# Patient Record
Sex: FEMALE | Race: WHITE | NOT HISPANIC OR LATINO | Employment: FULL TIME | URBAN - METROPOLITAN AREA
[De-identification: names, ages, dates, MRNs, and addresses within clinical notes are randomized per-mention and may not be internally consistent; named-entity substitution may affect disease eponyms.]

---

## 2022-10-24 ENCOUNTER — TELEPHONE (OUTPATIENT)
Dept: OTOLARYNGOLOGY | Facility: CLINIC | Age: 67
End: 2022-10-24

## 2022-10-24 NOTE — TELEPHONE ENCOUNTER
Received referral, however, patient's insurance has denied out of network coverage for appt. Faxed response to referring office.    ----- Message from Deshawn Frost MA sent at 10/24/2022  9:59 AM CDT -----  Contact: [phone]    ----- Message -----  From: Asiya Guan  Sent: 10/24/2022   9:56 AM CDT  To: [facility] Clinical Staff, #    Pt calling to schedule appt with Dr. Rinku ng please call and advise @ [phone]

## 2022-11-04 ENCOUNTER — TELEPHONE (OUTPATIENT)
Dept: OTOLARYNGOLOGY | Facility: CLINIC | Age: 67
End: 2022-11-04

## 2022-11-04 NOTE — TELEPHONE ENCOUNTER
----- Message from Debora Webster MA sent at 11/3/2022 12:56 PM CDT -----  Contact: [phone]  Pt states she was waiting on a phone call but have not heard anything back from anyone regarding an appt with Dr. Dobbs. Pt states she should be approved for appointment because her insurance is going to be changed from HMO to PPO. Please reach back out to pt at [phone]

## 2022-11-07 ENCOUNTER — PATIENT MESSAGE (OUTPATIENT)
Dept: OTOLARYNGOLOGY | Facility: CLINIC | Age: 67
End: 2022-11-07

## 2022-12-08 ENCOUNTER — PATIENT MESSAGE (OUTPATIENT)
Dept: OTOLARYNGOLOGY | Facility: CLINIC | Age: 67
End: 2022-12-08

## 2022-12-08 DIAGNOSIS — J38.3 SPASMODIC DYSPHONIA: Primary | ICD-10-CM

## 2023-01-04 ENCOUNTER — PATIENT MESSAGE (OUTPATIENT)
Dept: OTOLARYNGOLOGY | Facility: CLINIC | Age: 68
End: 2023-01-04
Payer: MEDICARE

## 2023-01-09 ENCOUNTER — OFFICE VISIT (OUTPATIENT)
Dept: OTOLARYNGOLOGY | Facility: CLINIC | Age: 68
End: 2023-01-09
Payer: COMMERCIAL

## 2023-01-09 VITALS — SYSTOLIC BLOOD PRESSURE: 105 MMHG | DIASTOLIC BLOOD PRESSURE: 71 MMHG

## 2023-01-09 DIAGNOSIS — R49.0 DYSPHONIA: ICD-10-CM

## 2023-01-09 DIAGNOSIS — J38.5 LARYNGEAL SPASM: ICD-10-CM

## 2023-01-09 DIAGNOSIS — J38.3 SPASMODIC DYSPHONIA: Primary | ICD-10-CM

## 2023-01-09 DIAGNOSIS — G24.9 DYSTONIA: ICD-10-CM

## 2023-01-09 PROCEDURE — 1126F PR PAIN SEVERITY QUANTIFIED, NO PAIN PRESENT: ICD-10-PCS | Mod: CPTII,S$GLB,, | Performed by: OTOLARYNGOLOGY

## 2023-01-09 PROCEDURE — 64617 CHEMODENER MUSCLE LARYNX EMG: CPT | Mod: 50,S$GLB,, | Performed by: OTOLARYNGOLOGY

## 2023-01-09 PROCEDURE — 1101F PT FALLS ASSESS-DOCD LE1/YR: CPT | Mod: CPTII,S$GLB,, | Performed by: OTOLARYNGOLOGY

## 2023-01-09 PROCEDURE — 1159F PR MEDICATION LIST DOCUMENTED IN MEDICAL RECORD: ICD-10-PCS | Mod: CPTII,S$GLB,, | Performed by: OTOLARYNGOLOGY

## 2023-01-09 PROCEDURE — 1160F PR REVIEW ALL MEDS BY PRESCRIBER/CLIN PHARMACIST DOCUMENTED: ICD-10-PCS | Mod: CPTII,S$GLB,, | Performed by: OTOLARYNGOLOGY

## 2023-01-09 PROCEDURE — 3078F PR MOST RECENT DIASTOLIC BLOOD PRESSURE < 80 MM HG: ICD-10-PCS | Mod: CPTII,S$GLB,, | Performed by: OTOLARYNGOLOGY

## 2023-01-09 PROCEDURE — 1159F MED LIST DOCD IN RCRD: CPT | Mod: CPTII,S$GLB,, | Performed by: OTOLARYNGOLOGY

## 2023-01-09 PROCEDURE — 31579 LARYNGOSCOPY TELESCOPIC: CPT | Mod: 51,S$GLB,, | Performed by: OTOLARYNGOLOGY

## 2023-01-09 PROCEDURE — 1126F AMNT PAIN NOTED NONE PRSNT: CPT | Mod: CPTII,S$GLB,, | Performed by: OTOLARYNGOLOGY

## 2023-01-09 PROCEDURE — 31579 PR LARYNGOSCOPY, FLEX/RIGID TELESCOPIC, W/STROBOSCOPY: ICD-10-PCS | Mod: 51,S$GLB,, | Performed by: OTOLARYNGOLOGY

## 2023-01-09 PROCEDURE — 99999 PR PBB SHADOW E&M-EST. PATIENT-LVL III: CPT | Mod: PBBFAC,,, | Performed by: OTOLARYNGOLOGY

## 2023-01-09 PROCEDURE — 3078F DIAST BP <80 MM HG: CPT | Mod: CPTII,S$GLB,, | Performed by: OTOLARYNGOLOGY

## 2023-01-09 PROCEDURE — 99999 PR PBB SHADOW E&M-EST. PATIENT-LVL III: ICD-10-PCS | Mod: PBBFAC,,, | Performed by: OTOLARYNGOLOGY

## 2023-01-09 PROCEDURE — 1160F RVW MEDS BY RX/DR IN RCRD: CPT | Mod: CPTII,S$GLB,, | Performed by: OTOLARYNGOLOGY

## 2023-01-09 PROCEDURE — 3074F SYST BP LT 130 MM HG: CPT | Mod: CPTII,S$GLB,, | Performed by: OTOLARYNGOLOGY

## 2023-01-09 PROCEDURE — 3288F FALL RISK ASSESSMENT DOCD: CPT | Mod: CPTII,S$GLB,, | Performed by: OTOLARYNGOLOGY

## 2023-01-09 PROCEDURE — 99204 OFFICE O/P NEW MOD 45 MIN: CPT | Mod: 25,S$GLB,, | Performed by: OTOLARYNGOLOGY

## 2023-01-09 PROCEDURE — 64617 PR CHEMODENERVATION LARYNX, UNI, PERCU EMG: ICD-10-PCS | Mod: 50,S$GLB,, | Performed by: OTOLARYNGOLOGY

## 2023-01-09 PROCEDURE — 99204 PR OFFICE/OUTPT VISIT, NEW, LEVL IV, 45-59 MIN: ICD-10-PCS | Mod: 25,S$GLB,, | Performed by: OTOLARYNGOLOGY

## 2023-01-09 PROCEDURE — 3288F PR FALLS RISK ASSESSMENT DOCUMENTED: ICD-10-PCS | Mod: CPTII,S$GLB,, | Performed by: OTOLARYNGOLOGY

## 2023-01-09 PROCEDURE — 1101F PR PT FALLS ASSESS DOC 0-1 FALLS W/OUT INJ PAST YR: ICD-10-PCS | Mod: CPTII,S$GLB,, | Performed by: OTOLARYNGOLOGY

## 2023-01-09 PROCEDURE — 3074F PR MOST RECENT SYSTOLIC BLOOD PRESSURE < 130 MM HG: ICD-10-PCS | Mod: CPTII,S$GLB,, | Performed by: OTOLARYNGOLOGY

## 2023-01-09 NOTE — PROGRESS NOTES
[facility]  Department of Otorhinolaryngology and Communication Sciences    Preoperative Diagnosis: 1. Adductor spasmodic dypshonia.  2. Laryngeal spasm.    Postoperative Diagnosis: 1. Adductor spasmodic dypshonia.  2. Laryngeal spasm.    Procedure: Chemodenervation of larynx, percutaneous, under guidance of needle electromyography, bilateral thyroarytenoid/lateral cricoarytenoid complex.    Toxin serotype used: Botox.    Total units employed:   1. Left TA/LCA - 2 units.  2. Right TA/LCA - 2 units.  3.  46 units wasted.    Surgeon: Yanick Dobbs M.D.     Estimated blood loss: None.    Anesthesia: Local.     Complications: None.    Procedure in detail: Madison Francis was positively identified with two identifiers and was seated upright in a comfortable position. Final time-out was performed for verification purposes. Local cutaneous and subcutaneous anesthesia was achieved with 1 mL of 1% lidocaine with epinephrine 1:100,000, injected into the skin and subcutaneous tissues at the level of the cricothyroid membrane. Surface electrodes were connected. Botulinum toxin was reconstituted with sterile normal saline at a concentration of 20 units/mL. A 1 mL tuberculin syringe pre-loaded with botulinum toxin was connected to a 37 mm 26-gauge injectable needle electrode. The needle was advanced percutaneously into the targeted muscle groups. Appropriate insertional activity and recruitment were noted based on visual and auditory feedback of electromyography. Under electromyographic guidance, botulinum toxin was administered, with the quantity of toxin used and muscle groups targeted outlined above. The equipment was removed. The patient tolerated the procedure well without complication. All needle counts were correct at the completion of the procedure. The patient was observed briefly before being discharged home in good condition.    Yanick Dobbs M.D.  [facility]  Department of  Otorhinolaryngology and Communication Sciences

## 2023-01-09 NOTE — PROGRESS NOTES
[facility]  Department of Otorhinolaryngology and Communication Sciences    Madison Francis is a 68 y.o. female who [facility] the [facility] for consultation at the kind request of Dr. 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     She complains of breaks in her voice, phonatory dyspnea, increased vocal effort. Difficulty reading aloud. Tremors in the voice. Unable to sing.    Onset was gradual. Duration is about 7 years, beginning without any clear inciting event while she was  at [facility]. Time course is constant. Symptoms are worsening. Exacerbating factors include stressful situations, telephone, certain combinations of letters. Alleviating factors include a glass of wine. She denies any associated symptoms.  She denies tremors/spasms/involuntary movements outside her voice issue.    She is currently pursuing a career in photography.    She has not seen a neurologist. Her brother has PD.    Dr. Devries suspects a Dx of SD and recommends consideration of btx injections.    Voice Handicap Index Total Score  1/2/2023   Voice Handicap Index Total Score 22     Reflux Symptoms Index Total Score 1/2/2023   Reflux Symptoms Index Total Score 11       Past Medical History  History breast DCIS    Past Surgical History  Lumpectomy  Cholecystectomy  Appendectomy  Tonsillectomy    Family History  Mother with BrCA  Father with throat (palate) cancer    Social History  She reports that she quit smoking about 2 years ago. Her smoking use included cigarettes and vaping with nicotine. She started smoking about 33 years ago. She has a 1.00 pack-year smoking history. She does not have any smokeless tobacco history on file.    Allergies  She is allergic to naproxen.    Medications  None    Review of Systems   Constitutional: Negative.  Negative for fever.   HENT:  Positive for ear pain, postnasal drip, sinus pressure and voice change. Negative for sore throat.     Eyes:  Positive for photophobia and itching. Negative for visual disturbance.   Respiratory: Negative.  Negative for wheezing.    Cardiovascular: Negative.  Negative for chest pain.   Gastrointestinal:  Positive for constipation. Negative for nausea.   Endocrine: Negative.    Genitourinary: Negative.    Musculoskeletal: Negative.  Negative for arthralgias.   Skin: Negative.  Negative for rash.   Neurological:  Positive for dizziness and headaches. Negative for tremors.   Hematological: Negative.  Does not bruise/bleed easily.   Psychiatric/Behavioral:  Positive for decreased concentration. The patient is not nervous/anxious.         Objective:     VS reviewed  Physical Exam  Constitutional: comfortable, well dressed  Psychiatric: appropriate affect  Respiratory: comfortably breathing, symmetric chest rise, no stridor  Voice: variable mild to severely tight/strained with breaks on voiced phonemes  Cardiovascular: upper extremities non-edematous  Lymphatic: no cervical lymphadenopathy  Neurologic: alert and oriented to time, place, person, and situation; cranial nerves 3-12 grossly intact  Head: normocephalic  Eyes: conjunctivae and sclerae clear  Ears: normal pinnae, normal external auditory canals, tympanic membranes intact  Nose: mucosa pink and noncongested, no masses, no mucopurulence, no polyps  Oral cavity / oropharynx: no mucosal lesions  Neck: soft, full range of motion, laryngotracheal complex palpable with appropriate landmarks, larynx elevates on swallowing  Indirect laryngoscopy: limited due to gag    Procedure  Flexible Laryngeal Videostroboscopy (39901): Laryngeal videostroboscopy is indicated to assess laryngeal vibratory biomechanics and vocal fold oscillation, which cannot be assessed with a plain light examination. This was carried out transnasally with a distal chip videoendoscope. After verbal consent was obtained, the patient was positioned and the nose was topically decongested with 1%  phenylephrine and topically anesthetized with 4% lidocaine. The endoscope was passed through the most patent nasal cavity and positioned to image the nasopharynx, larynx, and hypopharynx in detail. The following features were examined: nasopharyngeal, laryngeal, hypopharyngeal masses; velopharyngeal strength, closure, and symmetry of motion; vocal fold range and symmetry of motion; laryngeal mucosal edema, erythema, inflammation, and hydration; salivary pooling; and gross laryngeal sensation. During phonation, the vocal folds were assessed for glottal closure; mucosal wave; vocal fold lesions; vibratory periodicity, amplitude, and phase symmetry; and vertical height match. The equipment was removed. The patient tolerated the procedure well without complication. All findings were normal except:  - variable phonatory glottic overclosure posteriorly resulting in shortened phonatroy segment and impaired mucosal wave amplitudes         Assessment:     Madison Francis is a 68 y.o. 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       Plan:        I had a discussion with the patient regarding her condition and the further workup and management options.      I recommended comprehensive evaluation by movement disorders neurology.    The risks of laryngeal botulinum toxin chemodenervation were discussed at length with the patient. Risks included but were not limited to pain, bleeding, infection, worsening of voice, worsening of swallowing, dysphagia, aspiration, shortness of breath, noisy breathing, airway obstruction, need for additional injections or procedures, reaction to medication, and development of tolerance to the medication. Our careful and patient-centered approach will try to minimize side effects while also treating the dystonia/spasms/tremors. We emphasized to the patient that sometimes it takes a few injections to determine what dosing strategy works best for  each patient. All questions were answered, and the patient wishes to proceed.    See procedure note for details.    She will follow up with me in about 1 month(s).    All questions were answered, and the patient is in agreement with the above.     Yanick Dobbs M.D.  [facility]  Department of Otorhinolaryngology and Communication Sciences

## 2023-01-10 ENCOUNTER — PATIENT MESSAGE (OUTPATIENT)
Dept: OTOLARYNGOLOGY | Facility: CLINIC | Age: 68
End: 2023-01-10
Payer: COMMERCIAL

## 2023-01-25 ENCOUNTER — PATIENT MESSAGE (OUTPATIENT)
Dept: OTOLARYNGOLOGY | Facility: CLINIC | Age: 68
End: 2023-01-25
Payer: COMMERCIAL

## 2023-02-13 ENCOUNTER — DOCUMENTATION ONLY (OUTPATIENT)
Dept: OTOLARYNGOLOGY | Facility: CLINIC | Age: 68
End: 2023-02-13
Payer: COMMERCIAL

## 2023-02-13 NOTE — PROGRESS NOTES
Initial btx injection 1/9/2023 2 units bilateral TAs  Breathy x 2-3 days; no dysphagia  Tremors persist but voice is improved; continues to improve in recent days/weeks  Much more confidence, but still with some pitch instability    Will follow up for subsequent injection as voice deteriorates  Will titrate next dose as needed

## 2023-02-14 DIAGNOSIS — J38.3 SPASMODIC DYSPHONIA: Primary | ICD-10-CM

## 2023-02-14 DIAGNOSIS — R49.0 DYSPHONIA: ICD-10-CM

## 2023-02-14 DIAGNOSIS — J38.5 LARYNGEAL SPASM: ICD-10-CM

## 2023-03-10 ENCOUNTER — PROCEDURE VISIT (OUTPATIENT)
Dept: OTOLARYNGOLOGY | Facility: CLINIC | Age: 68
End: 2023-03-10
Payer: COMMERCIAL

## 2023-03-10 VITALS
WEIGHT: 155 LBS | DIASTOLIC BLOOD PRESSURE: 72 MMHG | SYSTOLIC BLOOD PRESSURE: 112 MMHG | BODY MASS INDEX: 24.91 KG/M2 | HEIGHT: 66 IN | HEART RATE: 67 BPM

## 2023-03-10 DIAGNOSIS — R49.0 DYSPHONIA: ICD-10-CM

## 2023-03-10 DIAGNOSIS — J38.5 LARYNGEAL SPASM: ICD-10-CM

## 2023-03-10 DIAGNOSIS — J38.3 SPASMODIC DYSPHONIA: Primary | ICD-10-CM

## 2023-03-10 PROCEDURE — 64617 CHEMODENER MUSCLE LARYNX EMG: CPT | Mod: 50,S$GLB,, | Performed by: OTOLARYNGOLOGY

## 2023-03-10 PROCEDURE — 64617 PR CHEMODENERVATION LARYNX, UNI, PERCU EMG: ICD-10-PCS | Mod: 50,S$GLB,, | Performed by: OTOLARYNGOLOGY

## 2023-03-10 RX ORDER — LORATADINE 10 MG/1
TABLET ORAL
COMMUNITY

## 2023-03-10 RX ORDER — LEVOCETIRIZINE DIHYDROCHLORIDE 5 MG/1
TABLET, FILM COATED ORAL
COMMUNITY

## 2023-03-10 RX ORDER — ERGOCALCIFEROL 1.25 MG/1
50000 CAPSULE ORAL
COMMUNITY
Start: 2022-10-21

## 2023-03-10 RX ORDER — AZELASTINE 1 MG/ML
SPRAY, METERED NASAL
COMMUNITY

## 2023-03-10 RX ORDER — FLUTICASONE PROPIONATE 50 MCG
SPRAY, SUSPENSION (ML) NASAL
COMMUNITY

## 2023-03-10 NOTE — PATIENT INSTRUCTIONS
What to expect:  Botox for adductor spasmodic dysphonia    The purpose of the Botox injection into the larynx is to cause a temporary weakening of the vocal fold muscles in order to reduce the voice spasms.  It takes several days to begin working, but after the first few days you will notice a decrease in the spasms.  Every injection may be slightly different, so it is important for you to keep track of your voice and swallowing status on the log sheet provided.  Please return the log each time you come in for an injection.      Basic Botox guidelines  Within 24-72 hours, you should expect your voice to become weak and breathy.  After one week, call into the Voice Center at [phone] and leave a message so our team can hear your voice and  the effectiveness of the injection.   We will not call you back unless you ask us to do so.      After 1-2 weeks, your voice should start to get stronger and have fewer spasms.  The stronger voice typically lasts for about three months, but there is some variability.    You may experience some difficulties with swallowing for the first week after your injection, but these should be short-lived.  Take extra care when drinking thin liquids such as water, coffee, juice, soda, and tea.  If you do find yourself coughing frequently with liquids, try tucking your chin while you swallow, or thickening your liquids.  Thick-It, a liquid thickener, can be purchased at most pharmacies.      Helpful resources:  http://www.dysphonia.org   http://www.kiki.org  Http://www.entlink.net    If you have any questions, please call our office at [phone], or contact us through the [facility] portal.

## 2023-03-10 NOTE — PROCEDURES
Procedures  [facility]  Department of Otorhinolaryngology and Communication Sciences    Last injection: 1/9/2023, 2 U bilateral TAs  Duration of breathiness: 2-3 days  Duration of dysphagia: 0 days  Duration:     Preoperative Diagnosis: 1. Adductor spasmodic dypshonia.  2. Laryngeal spasm.    Postoperative Diagnosis: 1. Adductor spasmodic dypshonia.  2. Laryngeal spasm.    Procedure: Chemodenervation of larynx, percutaneous, under guidance of needle electromyography, bilateral thyroarytenoid/lateral cricoarytenoid complex.    Toxin serotype used: Botox.    Total units employed:   1. Left TA/LCA - 2.5 units.  2. Right TA/LCA - 2.5 units.  3.  45 units wasted.    Surgeon: Yanick Dobbs M.D.     Estimated blood loss: None.    Anesthesia: Local.     Complications: None.    Procedure in detail: Madison Francis was positively identified with two identifiers and was seated upright in a comfortable position. Final time-out was performed for verification purposes. Local cutaneous and subcutaneous anesthesia was achieved with 1 mL of 1% lidocaine with epinephrine 1:100,000, injected into the skin and subcutaneous tissues at the level of the cricothyroid membrane. Surface electrodes were connected. Botulinum toxin was reconstituted with sterile normal saline at a concentration of 25 units/mL. A 1 mL tuberculin syringe pre-loaded with botulinum toxin was connected to a 37 mm 26-gauge injectable needle electrode. The needle was advanced percutaneously into the targeted muscle groups. Appropriate insertional activity and recruitment were noted based on visual and auditory feedback of electromyography. Under electromyographic guidance, botulinum toxin was administered, with the quantity of toxin used and muscle groups targeted outlined above. The equipment was removed. The patient tolerated the procedure well without complication. All needle counts were correct at the completion of the procedure. The patient was  observed briefly before being discharged home in good condition.    Yanick Dobbs M.D.  [facility]  Department of Otorhinolaryngology and Communication Sciences

## 2023-06-28 DIAGNOSIS — J38.5 LARYNGEAL SPASM: ICD-10-CM

## 2023-06-28 DIAGNOSIS — J38.3 SPASMODIC DYSPHONIA: Primary | ICD-10-CM

## 2023-08-11 ENCOUNTER — PROCEDURE VISIT (OUTPATIENT)
Dept: OTOLARYNGOLOGY | Facility: CLINIC | Age: 68
End: 2023-08-11
Payer: COMMERCIAL

## 2023-08-11 VITALS — DIASTOLIC BLOOD PRESSURE: 49 MMHG | SYSTOLIC BLOOD PRESSURE: 91 MMHG | HEART RATE: 76 BPM

## 2023-08-11 DIAGNOSIS — J38.5 LARYNGEAL SPASM: ICD-10-CM

## 2023-08-11 DIAGNOSIS — R49.0 DYSPHONIA: ICD-10-CM

## 2023-08-11 DIAGNOSIS — J38.3 SPASMODIC DYSPHONIA: Primary | ICD-10-CM

## 2023-08-11 PROCEDURE — 64617 PR CHEMODENERVATION LARYNX, UNI, PERCU EMG: ICD-10-PCS | Mod: 50,S$GLB,, | Performed by: OTOLARYNGOLOGY

## 2023-08-11 PROCEDURE — 64617 CHEMODENER MUSCLE LARYNX EMG: CPT | Mod: 50,S$GLB,, | Performed by: OTOLARYNGOLOGY

## 2023-08-11 NOTE — PROCEDURES
Procedures  [facility]  Department of Otorhinolaryngology and Communication Sciences    Last injection: 3/10/2023, 2.5 U bilateral TAs  Duration of breathiness: about 2 weeks  Duration of dysphagia: about 2 weeks  Duration of good voice:  almost 5 months; happy with result    Preoperative Diagnosis: 1. Adductor spasmodic dypshonia.  2. Laryngeal spasm.    Postoperative Diagnosis: 1. Adductor spasmodic dypshonia.  2. Laryngeal spasm.    Procedure: Chemodenervation of larynx, percutaneous, under guidance of needle electromyography, bilateral thyroarytenoid/lateral cricoarytenoid complex.    Toxin serotype used: Botox.    Total units employed:   1. Left TA/LCA - 2.5 units.  2. Right TA/LCA - 2.5 units.  3.  45 units wasted.    Surgeon: Yanick Dobbs M.D.     Estimated blood loss: None.    Anesthesia: Local.     Complications: None.    Procedure in detail: Madison Francis was positively identified with two identifiers and was seated upright in a comfortable position. Final time-out was performed for verification purposes. Local cutaneous and subcutaneous anesthesia was achieved with 1 mL of 1% lidocaine with epinephrine 1:100,000, injected into the skin and subcutaneous tissues at the level of the cricothyroid membrane. Surface electrodes were connected. Botulinum toxin was reconstituted with sterile normal saline at a concentration of 25 units/mL. A 1 mL tuberculin syringe pre-loaded with botulinum toxin was connected to a 37 mm 26-gauge injectable needle electrode. The needle was advanced percutaneously into the targeted muscle groups. Appropriate insertional activity and recruitment were noted based on visual and auditory feedback of electromyography. Under electromyographic guidance, botulinum toxin was administered, with the quantity of toxin used and muscle groups targeted outlined above. The equipment was removed. The patient tolerated the procedure well without complication. All needle counts were  correct at the completion of the procedure. The patient was observed briefly before being discharged home in good condition.    Yanick Dobbs M.D.  [facility]  Department of Otorhinolaryngology and Communication Sciences

## 2023-08-11 NOTE — PATIENT INSTRUCTIONS
What to expect:  Botox for adductor spasmodic dysphonia    The purpose of the Botox injection into the larynx is to cause a temporary weakening of the vocal fold muscles in order to reduce the voice spasms.  It takes several days to begin working, but after the first few days you will notice a decrease in the spasms.  Every injection may be slightly different, so it is important for you to keep track of your voice and swallowing status on the log sheet provided.  Please return the log each time you come in for an injection.      Basic Botox guidelines  Within 24-72 hours, you should expect your voice to become weak and breathy.  After one week, call into the Voice Center at [phone] and leave a message so our team can hear your voice and  the effectiveness of the injection.   We will not call you back unless you ask us to do so.      After 1-2 weeks, your voice should start to get stronger and have fewer spasms.  The stronger voice typically lasts for about three months, but there is some variability.    You may experience some difficulties with swallowing for the first week after your injection, but these should be short-lived.  Take extra care when drinking thin liquids such as water, coffee, juice, soda, and tea.  If you do find yourself coughing frequently with liquids, try tucking your chin while you swallow, or thickening your liquids.  Thick-It, a liquid thickener, can be purchased at most pharmacies.      Helpful resources:  http://www.dysphonia.org   http://www.kiki.org  Http://www.entlink.net    If you have any questions, please call our office at [phone], or contact us through the [facility] portal.

## 2024-05-07 DIAGNOSIS — J38.3 SPASMODIC DYSPHONIA: Primary | ICD-10-CM

## 2024-05-07 DIAGNOSIS — J38.5 LARYNGEAL SPASM: ICD-10-CM

## 2024-06-18 ENCOUNTER — PROCEDURE VISIT (OUTPATIENT)
Dept: OTOLARYNGOLOGY | Facility: CLINIC | Age: 69
End: 2024-06-18
Payer: COMMERCIAL

## 2024-06-18 VITALS — SYSTOLIC BLOOD PRESSURE: 116 MMHG | HEART RATE: 87 BPM | DIASTOLIC BLOOD PRESSURE: 71 MMHG

## 2024-06-18 DIAGNOSIS — J38.3 LARYNGEAL DYSTONIA: ICD-10-CM

## 2024-06-18 DIAGNOSIS — J38.5 LARYNGEAL SPASM: Primary | ICD-10-CM

## 2024-06-18 PROCEDURE — 64617 CHEMODENER MUSCLE LARYNX EMG: CPT | Mod: 50,S$GLB,, | Performed by: OTOLARYNGOLOGY

## 2024-06-18 NOTE — PATIENT INSTRUCTIONS
What to expect:  Botox for adductor spasmodic dysphonia    The purpose of the Botox injection into the larynx is to cause a temporary weakening of the vocal fold muscles in order to reduce the voice spasms.  It takes several days to begin working, but after the first few days you will notice a decrease in the spasms.  Every injection may be slightly different, so it is important for you to keep track of your voice and swallowing status on the log sheet provided.  Please return the log each time you come in for an injection.      Basic Botox guidelines  Within 24-72 hours, you should expect your voice to become weak and breathy.  After one week, call into the [facility] Center at [phone] and leave a message so our team can hear your voice and  the effectiveness of the injection.   We will not call you back unless you ask us to do so.      After 1-2 weeks, your voice should start to get stronger and have fewer spasms.  The stronger voice typically lasts for about three months, but there is some variability.    You may experience some difficulties with swallowing for the first week after your injection, but these should be short-lived.  Take extra care when drinking thin liquids such as water, coffee, juice, soda, and tea.  If you do find yourself coughing frequently with liquids, try tucking your chin while you swallow, or thickening your liquids.  Thick-It, a liquid thickener, can be purchased at most pharmacies.      Helpful resources:  http://www.dysphonia.org   http://www.kiki.org  Http://www.entlink.net    If you have any questions, please call our office at [phone], or contact us through the [facility] portal.

## 2024-06-18 NOTE — PROCEDURES
Procedures  [facility]  Department of Otorhinolaryngology and Communication Sciences    Last injection: 8/2023, 2.5 U bilateral TAs  Duration of breathiness: 1-2 days  Duration of dysphagia: none  Duration of good voice:  almost 7 months; happy with result    Preoperative Diagnosis: 1. Adductor spasmodic dypshonia.  2. Laryngeal spasm.    Postoperative Diagnosis: 1. Adductor spasmodic dypshonia.  2. Laryngeal spasm.    Procedure: Chemodenervation of larynx, percutaneous, under guidance of needle electromyography, bilateral thyroarytenoid/lateral cricoarytenoid complex.    Toxin serotype used: Botox.    Total units employed:   1. Left TA/LCA - 2.5 units.  2. Right TA/LCA - 2.5 units.  3.  45 units wasted.    Surgeon: Yanick Dobbs M.D.     Estimated blood loss: None.    Anesthesia: Local.     Complications: None.    Procedure in detail: Madison Francis was positively identified with two identifiers and was seated upright in a comfortable position. Final time-out was performed for verification purposes. Local cutaneous and subcutaneous anesthesia was achieved with 1 mL of 1% lidocaine with epinephrine 1:100,000, injected into the skin and subcutaneous tissues at the level of the cricothyroid membrane. Surface electrodes were connected. Botulinum toxin was reconstituted with sterile normal saline at a concentration of 25 units/mL. A 1 mL tuberculin syringe pre-loaded with botulinum toxin was connected to a 37 mm 26-gauge injectable needle electrode. The needle was advanced percutaneously into the targeted muscle groups. Appropriate insertional activity and recruitment were noted based on visual and auditory feedback of electromyography. Under electromyographic guidance, botulinum toxin was administered, with the quantity of toxin used and muscle groups targeted outlined above. The equipment was removed. The patient tolerated the procedure well without complication. All needle counts were correct at the  completion of the procedure. The patient was observed briefly before being discharged home in good condition.    Yanick Dobbs M.D.  [facility]  Department of Otorhinolaryngology and Communication Sciences

## 2024-10-11 ENCOUNTER — PROCEDURE VISIT (OUTPATIENT)
Dept: OTOLARYNGOLOGY | Facility: CLINIC | Age: 69
End: 2024-10-11
Payer: COMMERCIAL

## 2024-10-11 VITALS — DIASTOLIC BLOOD PRESSURE: 53 MMHG | SYSTOLIC BLOOD PRESSURE: 97 MMHG | HEART RATE: 76 BPM

## 2024-10-11 DIAGNOSIS — J38.3 SPASMODIC DYSPHONIA: ICD-10-CM

## 2024-10-11 DIAGNOSIS — J38.5 LARYNGEAL SPASM: Primary | ICD-10-CM

## 2024-10-11 DIAGNOSIS — J38.3 LARYNGEAL DYSTONIA: ICD-10-CM

## 2024-10-11 RX ORDER — ANASTROZOLE 1 MG/1
1 TABLET ORAL DAILY
COMMUNITY

## 2024-10-11 RX ORDER — CITALOPRAM 10 MG/1
1 TABLET ORAL DAILY
COMMUNITY

## 2024-10-11 RX ORDER — BIOTIN 1 MG
TABLET ORAL
COMMUNITY

## 2024-10-11 RX ORDER — MONTELUKAST SODIUM 10 MG/1
1 TABLET ORAL DAILY
COMMUNITY

## 2024-10-11 RX ORDER — TRAMADOL HYDROCHLORIDE 50 MG/1
1 TABLET ORAL 3 TIMES DAILY PRN
COMMUNITY

## 2024-10-11 RX ORDER — DICLOFENAC SODIUM 75 MG/1
1 TABLET, DELAYED RELEASE ORAL 2 TIMES DAILY WITH MEALS
COMMUNITY

## 2024-10-11 RX ORDER — FLUCONAZOLE 200 MG/1
TABLET ORAL
COMMUNITY
Start: 2024-04-24

## 2024-10-11 RX ORDER — SOD SULF/POT CHLORIDE/MAG SULF 1.479 G
TABLET ORAL
COMMUNITY
Start: 2023-04-17

## 2024-10-11 RX ORDER — ROSUVASTATIN CALCIUM 10 MG/1
1 TABLET, COATED ORAL DAILY
COMMUNITY

## 2024-10-11 RX ORDER — LETROZOLE 2.5 MG/1
1 TABLET, FILM COATED ORAL DAILY
COMMUNITY

## 2024-10-11 RX ORDER — SILVER SULFADIAZINE 10 G/1000G
CREAM TOPICAL
COMMUNITY

## 2024-10-11 NOTE — PATIENT INSTRUCTIONS
What to expect:  Botox for adductor spasmodic dysphonia    The purpose of the Botox injection into the larynx is to cause a temporary weakening of the vocal fold muscles in order to reduce the voice spasms.  It takes several days to begin working, but after the first few days you will notice a decrease in the spasms.  Every injection may be slightly different, so it is important for you to keep track of your voice and swallowing status on the log sheet provided.  Please return the log each time you come in for an injection.      Basic Botox guidelines  Within 24-72 hours, you should expect your voice to become weak and breathy.  After one week, call into the Voice Center at [phone] and leave a message so our team can hear your voice and  the effectiveness of the injection.   We will not call you back unless you ask us to do so.      After 1-2 weeks, your voice should start to get stronger and have fewer spasms.  The stronger voice typically lasts for about three months, but there is some variability.    You may experience some difficulties with swallowing for the first week after your injection, but these should be short-lived.  Take extra care when drinking thin liquids such as water, coffee, juice, soda, and tea.  If you do find yourself coughing frequently with liquids, try tucking your chin while you swallow, or thickening your liquids.  Thick-It, a liquid thickener, can be purchased at most pharmacies.      Helpful resources:  http://www.dysphonia.org   http://www.kiki.org  Http://www.entlink.net    If you have any questions, please call our office at [phone], or contact us through the [facility] portal.

## 2025-02-07 ENCOUNTER — PROCEDURE VISIT (OUTPATIENT)
Dept: OTOLARYNGOLOGY | Facility: CLINIC | Age: 70
End: 2025-02-07
Payer: COMMERCIAL

## 2025-02-07 VITALS
WEIGHT: 155.44 LBS | BODY MASS INDEX: 25.09 KG/M2 | SYSTOLIC BLOOD PRESSURE: 113 MMHG | DIASTOLIC BLOOD PRESSURE: 72 MMHG

## 2025-02-07 DIAGNOSIS — J38.5 LARYNGEAL SPASM: Primary | ICD-10-CM

## 2025-02-07 DIAGNOSIS — J38.3 SPASMODIC DYSPHONIA: ICD-10-CM

## 2025-02-07 DIAGNOSIS — J38.3 LARYNGEAL DYSTONIA: ICD-10-CM

## 2025-02-07 PROCEDURE — 64617 CHEMODENER MUSCLE LARYNX EMG: CPT | Mod: 50,S$GLB,, | Performed by: OTOLARYNGOLOGY

## 2025-02-07 NOTE — PROCEDURES
"Procedures  [facility]  Department of Otorhinolaryngology and Communication Sciences    Last injection: 10/2024, 2.5 U bilateral TAs  Duration of breathiness: 2 days  Duration of dysphagia: none  Duration of good voice:  by week 2, her voice was "the best it's been" for about 4 weeks, then deteriorated to severe strained breaks    Preoperative Diagnosis: 1. Adductor spasmodic dypshonia.  2. Laryngeal spasm.    Postoperative Diagnosis: 1. Adductor spasmodic dypshonia.  2. Laryngeal spasm.    Procedure: Chemodenervation of larynx, percutaneous, under guidance of needle electromyography, bilateral thyroarytenoid/lateral cricoarytenoid complex.    Toxin serotype used: Botox.    Total units employed:   1. Left TA/LCA - 3 units.  2. Right TA/LCA - 3 units.  3.  44 units wasted.    Surgeon: Yanick Dobbs M.D.     Estimated blood loss: None.    Anesthesia: Local.     Complications: None.    Procedure in detail: Madison Francis was positively identified with two identifiers and was seated upright in a comfortable position. Final time-out was performed for verification purposes. Local cutaneous and subcutaneous anesthesia was achieved with 1 mL of 1% lidocaine with epinephrine 1:100,000, injected into the skin and subcutaneous tissues at the level of the cricothyroid membrane. Surface electrodes were connected. Botulinum toxin was reconstituted with sterile normal saline at a concentration of 30 units/mL. A 1 mL tuberculin syringe pre-loaded with botulinum toxin was connected to a 37 mm 26-gauge injectable needle electrode. The needle was advanced percutaneously into the targeted muscle groups. Appropriate insertional activity and recruitment were noted based on visual and auditory feedback of electromyography. Under electromyographic guidance, botulinum toxin was administered, with the quantity of toxin used and muscle groups targeted outlined above. The equipment was removed. The patient tolerated the " procedure well without complication. All needle counts were correct at the completion of the procedure. The patient was observed briefly before being discharged home in good condition.    Yanick Dobbs M.D.  [facility]  Department of Otorhinolaryngology and Communication Sciences

## 2025-04-08 ENCOUNTER — PATIENT MESSAGE (OUTPATIENT)
Dept: OTOLARYNGOLOGY | Facility: CLINIC | Age: 70
End: 2025-04-08
Payer: COMMERCIAL